# Patient Record
Sex: MALE | Race: WHITE | NOT HISPANIC OR LATINO
[De-identification: names, ages, dates, MRNs, and addresses within clinical notes are randomized per-mention and may not be internally consistent; named-entity substitution may affect disease eponyms.]

---

## 2017-06-27 ENCOUNTER — APPOINTMENT (OUTPATIENT)
Dept: FAMILY MEDICINE | Facility: CLINIC | Age: 47
End: 2017-06-27

## 2017-06-27 VITALS
DIASTOLIC BLOOD PRESSURE: 80 MMHG | HEIGHT: 68 IN | RESPIRATION RATE: 12 BRPM | WEIGHT: 176 LBS | HEART RATE: 78 BPM | OXYGEN SATURATION: 98 % | BODY MASS INDEX: 26.67 KG/M2 | SYSTOLIC BLOOD PRESSURE: 116 MMHG

## 2017-07-10 ENCOUNTER — APPOINTMENT (OUTPATIENT)
Dept: SURGERY | Facility: CLINIC | Age: 47
End: 2017-07-10

## 2017-07-17 ENCOUNTER — OUTPATIENT (OUTPATIENT)
Dept: OUTPATIENT SERVICES | Facility: HOSPITAL | Age: 47
LOS: 1 days | End: 2017-07-17
Payer: COMMERCIAL

## 2017-07-17 PROCEDURE — 85025 COMPLETE CBC W/AUTO DIFF WBC: CPT

## 2017-07-17 PROCEDURE — G0463: CPT

## 2017-07-17 PROCEDURE — 36415 COLL VENOUS BLD VENIPUNCTURE: CPT

## 2017-07-18 ENCOUNTER — APPOINTMENT (OUTPATIENT)
Dept: FAMILY MEDICINE | Facility: CLINIC | Age: 47
End: 2017-07-18

## 2017-07-18 VITALS
DIASTOLIC BLOOD PRESSURE: 70 MMHG | OXYGEN SATURATION: 99 % | BODY MASS INDEX: 26.51 KG/M2 | RESPIRATION RATE: 14 BRPM | SYSTOLIC BLOOD PRESSURE: 114 MMHG | WEIGHT: 174.9 LBS | HEIGHT: 68 IN | HEART RATE: 68 BPM

## 2017-07-18 RX ORDER — CIPROFLOXACIN HYDROCHLORIDE 500 MG/1
500 TABLET, FILM COATED ORAL
Qty: 20 | Refills: 0 | Status: COMPLETED | COMMUNITY
Start: 2017-06-20

## 2017-07-18 RX ORDER — METRONIDAZOLE 500 MG/1
500 TABLET ORAL
Qty: 30 | Refills: 0 | Status: COMPLETED | COMMUNITY
Start: 2017-06-20

## 2017-07-21 ENCOUNTER — RESULT REVIEW (OUTPATIENT)
Age: 47
End: 2017-07-21

## 2017-07-21 ENCOUNTER — OUTPATIENT (OUTPATIENT)
Dept: OUTPATIENT SERVICES | Facility: HOSPITAL | Age: 47
LOS: 1 days | Discharge: HOME | End: 2017-07-21
Payer: COMMERCIAL

## 2017-07-21 ENCOUNTER — TRANSCRIPTION ENCOUNTER (OUTPATIENT)
Age: 47
End: 2017-07-21

## 2017-07-21 PROCEDURE — 88302 TISSUE EXAM BY PATHOLOGIST: CPT | Mod: 26

## 2017-07-21 PROCEDURE — 49585: CPT

## 2017-07-22 PROCEDURE — 88302 TISSUE EXAM BY PATHOLOGIST: CPT

## 2017-07-22 PROCEDURE — 49587: CPT

## 2017-07-26 ENCOUNTER — APPOINTMENT (OUTPATIENT)
Dept: SURGERY | Facility: CLINIC | Age: 47
End: 2017-07-26

## 2017-07-26 DIAGNOSIS — K59.09 OTHER CONSTIPATION: ICD-10-CM

## 2017-07-26 DIAGNOSIS — K57.32 DIVERTICULITIS OF LARGE INTESTINE W/OUT PERFORATION OR ABSCESS W/OUT BLEEDING: ICD-10-CM

## 2017-07-27 RX ORDER — OXYCODONE AND ACETAMINOPHEN 5; 325 MG/1; MG/1
5-325 TABLET ORAL
Qty: 20 | Refills: 0 | Status: ACTIVE | COMMUNITY
Start: 2017-07-21

## 2017-07-31 ENCOUNTER — APPOINTMENT (OUTPATIENT)
Dept: SURGERY | Facility: CLINIC | Age: 47
End: 2017-07-31
Payer: COMMERCIAL

## 2017-07-31 DIAGNOSIS — Z80.42 FAMILY HISTORY OF MALIGNANT NEOPLASM OF PROSTATE: ICD-10-CM

## 2017-07-31 PROCEDURE — 99024 POSTOP FOLLOW-UP VISIT: CPT

## 2017-10-05 ENCOUNTER — EMERGENCY (EMERGENCY)
Facility: HOSPITAL | Age: 47
LOS: 1 days | Discharge: HOME | End: 2017-10-05
Admitting: FAMILY MEDICINE
Payer: COMMERCIAL

## 2017-10-05 PROCEDURE — 99285 EMERGENCY DEPT VISIT HI MDM: CPT

## 2017-10-05 PROCEDURE — 74177 CT ABD & PELVIS W/CONTRAST: CPT | Mod: 26

## 2017-10-06 PROCEDURE — 80053 COMPREHEN METABOLIC PANEL: CPT

## 2017-10-06 PROCEDURE — 99284 EMERGENCY DEPT VISIT MOD MDM: CPT | Mod: 25

## 2017-10-06 PROCEDURE — 74177 CT ABD & PELVIS W/CONTRAST: CPT

## 2017-10-06 PROCEDURE — 96365 THER/PROPH/DIAG IV INF INIT: CPT | Mod: XU

## 2017-10-06 PROCEDURE — 96375 TX/PRO/DX INJ NEW DRUG ADDON: CPT

## 2017-10-06 PROCEDURE — 85610 PROTHROMBIN TIME: CPT

## 2017-10-06 PROCEDURE — 85027 COMPLETE CBC AUTOMATED: CPT

## 2017-10-06 PROCEDURE — 85730 THROMBOPLASTIN TIME PARTIAL: CPT

## 2023-05-25 ENCOUNTER — NON-APPOINTMENT (OUTPATIENT)
Age: 53
End: 2023-05-25

## 2023-05-25 ENCOUNTER — APPOINTMENT (OUTPATIENT)
Dept: FAMILY MEDICINE | Facility: CLINIC | Age: 53
End: 2023-05-25
Payer: COMMERCIAL

## 2023-05-25 VITALS
DIASTOLIC BLOOD PRESSURE: 70 MMHG | WEIGHT: 142 LBS | SYSTOLIC BLOOD PRESSURE: 102 MMHG | TEMPERATURE: 99.1 F | HEIGHT: 68 IN | BODY MASS INDEX: 21.52 KG/M2 | OXYGEN SATURATION: 98 % | HEART RATE: 82 BPM

## 2023-05-25 DIAGNOSIS — Z87.19 PERSONAL HISTORY OF OTHER DISEASES OF THE DIGESTIVE SYSTEM: ICD-10-CM

## 2023-05-25 DIAGNOSIS — Z00.00 ENCOUNTER FOR GENERAL ADULT MEDICAL EXAMINATION W/OUT ABNORMAL FINDINGS: ICD-10-CM

## 2023-05-25 DIAGNOSIS — R68.82 DECREASED LIBIDO: ICD-10-CM

## 2023-05-25 DIAGNOSIS — Z12.5 ENCOUNTER FOR SCREENING FOR MALIGNANT NEOPLASM OF PROSTATE: ICD-10-CM

## 2023-05-25 DIAGNOSIS — K40.90 UNILATERAL INGUINAL HERNIA, W/OUT OBSTRUCTION OR GANGRENE, NOT SPECIFIED AS RECURRENT: ICD-10-CM

## 2023-05-25 DIAGNOSIS — E73.9 LACTOSE INTOLERANCE, UNSPECIFIED: ICD-10-CM

## 2023-05-25 DIAGNOSIS — K21.9 GASTRO-ESOPHAGEAL REFLUX DISEASE W/OUT ESOPHAGITIS: ICD-10-CM

## 2023-05-25 PROCEDURE — 93000 ELECTROCARDIOGRAM COMPLETE: CPT

## 2023-05-25 PROCEDURE — 99386 PREV VISIT NEW AGE 40-64: CPT | Mod: 25

## 2023-05-25 RX ORDER — PANTOPRAZOLE 20 MG/1
20 TABLET, DELAYED RELEASE ORAL DAILY
Refills: 1 | Status: ACTIVE | COMMUNITY
Start: 2023-05-25

## 2023-05-25 NOTE — HEALTH RISK ASSESSMENT
[Patient reported colonoscopy was abnormal] : Patient reported colonoscopy was abnormal [Current] : Current [20 or more] : 20 or more [ColonoscopyDate] : 02/22 [ColonoscopyComments] : polyps, repeat in 2025

## 2023-05-25 NOTE — ASSESSMENT
[FreeTextEntry1] : [facility] - pt declines surgery referral for repair at this time\par Castellanos's - cont protonix, f/u with GI\par decreased libido - check testosteorne level\par Healthy diet and regular exercise regimen discussed w/ pt.\par Screening labs ordered\par flu vaccine during flu season recommended\par Any questions call office

## 2023-05-25 NOTE — HISTORY OF PRESENT ILLNESS
[de-identified] : New patient to establish care.\par Pt in office for CPE. Pt has been feeling well. \par Pt has hx of Castellanos's esophagus. [facility]. Pt on pantoprazole 20mg q day. Last EGD 03/2022. Repeat in 2025.\par Pt has hx of diverticulitis 03/2022, s/p sigmoid resection due to stricture. Colorectal sx Dr. Rockwell (sp?)\par Pt c/o poor libido in past year.\par Pt has L inguinal hernia for 20 yrs, recently started causing pain in past few weeks.\par Pt has hx of OA of b/l knees, L hip, R elbow\par S/p b/l ulnar nerve transposition 2013\par Denies CP, palpitations, dyspnea, n/v.\par smokeless tobacco x 30 yrs. Failed patch in the past. 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

## 2023-06-01 LAB
ALBUMIN SERPL ELPH-MCNC: 5 G/DL
ALP BLD-CCNC: 72 U/L
ALT SERPL-CCNC: 20 U/L
ANION GAP SERPL CALC-SCNC: 14 MMOL/L
APPEARANCE: CLEAR
AST SERPL-CCNC: 27 U/L
BACTERIA: NEGATIVE /HPF
BILIRUB SERPL-MCNC: 0.5 MG/DL
BILIRUBIN URINE: NEGATIVE
BLOOD URINE: NEGATIVE
BUN SERPL-MCNC: 11 MG/DL
CALCIUM SERPL-MCNC: 9.7 MG/DL
CAST: 0 /LPF
CHLORIDE SERPL-SCNC: 100 MMOL/L
CHOLEST SERPL-MCNC: 215 MG/DL
CO2 SERPL-SCNC: 27 MMOL/L
COLOR: YELLOW
CREAT SERPL-MCNC: 0.82 MG/DL
EGFR: 106 ML/MIN/1.73M2
EPITHELIAL CELLS: 0 /HPF
ESTIMATED AVERAGE GLUCOSE: 105 MG/DL
GLUCOSE QUALITATIVE U: NEGATIVE MG/DL
GLUCOSE SERPL-MCNC: 81 MG/DL
HBA1C MFR BLD HPLC: 5.3 %
HDLC SERPL-MCNC: 77 MG/DL
KETONES URINE: NEGATIVE MG/DL
LDLC SERPL CALC-MCNC: 122 MG/DL
LEUKOCYTE ESTERASE URINE: NEGATIVE
MICROSCOPIC-UA: NORMAL
NITRITE URINE: NEGATIVE
NONHDLC SERPL-MCNC: 138 MG/DL
PH URINE: 6.5
POTASSIUM SERPL-SCNC: 4.3 MMOL/L
PROT SERPL-MCNC: 7.3 G/DL
PROTEIN URINE: NEGATIVE MG/DL
PSA SERPL-MCNC: 0.27 NG/ML
RED BLOOD CELLS URINE: 0 /HPF
SODIUM SERPL-SCNC: 142 MMOL/L
SPECIFIC GRAVITY URINE: 1
T4 FREE SERPL-MCNC: 1.5 NG/DL
TESTOST SERPL-MCNC: 438 NG/DL
TRIGL SERPL-MCNC: 77 MG/DL
TSH SERPL-ACNC: 2.42 UIU/ML
UROBILINOGEN URINE: 0.2 MG/DL
WHITE BLOOD CELLS URINE: 0 /HPF

## 2023-10-04 ENCOUNTER — APPOINTMENT (OUTPATIENT)
Dept: FAMILY MEDICINE | Facility: CLINIC | Age: 53
End: 2023-10-04
Payer: COMMERCIAL

## 2023-10-04 ENCOUNTER — LABORATORY RESULT (OUTPATIENT)
Age: 53
End: 2023-10-04

## 2023-10-04 VITALS
SYSTOLIC BLOOD PRESSURE: 138 MMHG | HEART RATE: 76 BPM | OXYGEN SATURATION: 99 % | BODY MASS INDEX: 21.52 KG/M2 | TEMPERATURE: 98 F | RESPIRATION RATE: 16 BRPM | WEIGHT: 142 LBS | DIASTOLIC BLOOD PRESSURE: 80 MMHG | HEIGHT: 68 IN

## 2023-10-04 DIAGNOSIS — H10.30 UNSPECIFIED ACUTE CONJUNCTIVITIS, UNSPECIFIED EYE: ICD-10-CM

## 2023-10-04 DIAGNOSIS — H01.119 ALLERGIC DERMATITIS OF UNSPECIFIED EYE, UNSPECIFIED EYELID: ICD-10-CM

## 2023-10-04 DIAGNOSIS — R53.83 OTHER FATIGUE: ICD-10-CM

## 2023-10-04 PROCEDURE — 99214 OFFICE O/P EST MOD 30 MIN: CPT

## 2023-10-04 RX ORDER — ERYTHROMYCIN 5 MG/G
5 OINTMENT OPHTHALMIC
Qty: 1 | Refills: 0 | Status: ACTIVE | COMMUNITY
Start: 2023-10-04 | End: 1900-01-01

## 2023-10-04 RX ORDER — HYDROCORTISONE 10 MG/G
1 OINTMENT TOPICAL TWICE DAILY
Qty: 1 | Refills: 0 | Status: ACTIVE | COMMUNITY
Start: 2023-10-04 | End: 1900-01-01

## 2023-10-06 ENCOUNTER — LABORATORY RESULT (OUTPATIENT)
Age: 53
End: 2023-10-06

## 2023-10-09 ENCOUNTER — NON-APPOINTMENT (OUTPATIENT)
Age: 53
End: 2023-10-09

## 2023-10-09 LAB
25(OH)D3 SERPL-MCNC: 23.7 NG/ML
ANA SER IF-ACNC: NEGATIVE
CRP SERPL-MCNC: <3 MG/L
DSDNA AB SER-ACNC: <12 IU/ML
ENA SS-A AB SER IA-ACNC: <0.2 AL
ENA SS-B AB SER IA-ACNC: <0.2 AL
ERYTHROCYTE [SEDIMENTATION RATE] IN BLOOD BY WESTERGREN METHOD: 9 MM/HR
FOLATE SERPL-MCNC: 12.5 NG/ML
RHEUMATOID FACT SER QL: <10 IU/ML
VIT B12 SERPL-MCNC: 459 PG/ML
VIT B6 SERPL-MCNC: 6.1 UG/L

## 2023-10-23 ENCOUNTER — APPOINTMENT (OUTPATIENT)
Dept: FAMILY MEDICINE | Facility: CLINIC | Age: 53
End: 2023-10-23
Payer: COMMERCIAL

## 2023-10-23 ENCOUNTER — TRANSCRIPTION ENCOUNTER (OUTPATIENT)
Age: 53
End: 2023-10-23

## 2023-10-23 VITALS
SYSTOLIC BLOOD PRESSURE: 130 MMHG | TEMPERATURE: 98.2 F | WEIGHT: 145 LBS | BODY MASS INDEX: 21.98 KG/M2 | HEART RATE: 85 BPM | HEIGHT: 68 IN | OXYGEN SATURATION: 98 % | DIASTOLIC BLOOD PRESSURE: 72 MMHG

## 2023-10-23 DIAGNOSIS — L30.9 DERMATITIS, UNSPECIFIED: ICD-10-CM

## 2023-10-23 PROCEDURE — 99214 OFFICE O/P EST MOD 30 MIN: CPT

## 2023-10-23 RX ORDER — KETOCONAZOLE 20 MG/G
2 CREAM TOPICAL TWICE DAILY
Qty: 1 | Refills: 0 | Status: ACTIVE | COMMUNITY
Start: 2023-10-23 | End: 1900-01-01

## 2023-10-24 RX ORDER — PREDNISONE 20 MG/1
20 TABLET ORAL
Qty: 15 | Refills: 0 | Status: ACTIVE | COMMUNITY
Start: 2023-10-23 | End: 1900-01-01

## 2023-10-27 LAB — BACTERIA SPEC CULT: ABNORMAL

## 2023-11-01 ENCOUNTER — APPOINTMENT (OUTPATIENT)
Dept: DERMATOLOGY | Facility: CLINIC | Age: 53
End: 2023-11-01
Payer: COMMERCIAL

## 2023-11-01 DIAGNOSIS — D22.5 MELANOCYTIC NEVI OF TRUNK: ICD-10-CM

## 2023-11-01 PROCEDURE — 99203 OFFICE O/P NEW LOW 30 MIN: CPT

## 2023-11-01 RX ORDER — MOMETASONE FUROATE 1 MG/G
0.1 CREAM TOPICAL
Qty: 1 | Refills: 1 | Status: ACTIVE | COMMUNITY
Start: 2023-11-01 | End: 1900-01-01

## 2023-11-01 RX ORDER — HYDROCORTISONE 25 MG/G
2.5 OINTMENT TOPICAL
Qty: 1 | Refills: 1 | Status: ACTIVE | COMMUNITY
Start: 2023-11-01 | End: 1900-01-01

## 2023-11-18 ENCOUNTER — APPOINTMENT (OUTPATIENT)
Dept: FAMILY MEDICINE | Facility: CLINIC | Age: 53
End: 2023-11-18
Payer: COMMERCIAL

## 2023-11-18 VITALS
HEIGHT: 68 IN | HEART RATE: 85 BPM | TEMPERATURE: 97.7 F | OXYGEN SATURATION: 98 % | DIASTOLIC BLOOD PRESSURE: 70 MMHG | WEIGHT: 147 LBS | BODY MASS INDEX: 22.28 KG/M2 | SYSTOLIC BLOOD PRESSURE: 120 MMHG

## 2023-11-18 DIAGNOSIS — K92.0 HEMATEMESIS: ICD-10-CM

## 2023-11-18 DIAGNOSIS — K22.70 BARRETT'S ESOPHAGUS W/OUT DYSPLASIA: ICD-10-CM

## 2023-11-18 PROCEDURE — 99213 OFFICE O/P EST LOW 20 MIN: CPT

## 2024-01-02 ENCOUNTER — APPOINTMENT (OUTPATIENT)
Dept: DERMATOLOGY | Facility: CLINIC | Age: 54
End: 2024-01-02
Payer: COMMERCIAL

## 2024-01-02 DIAGNOSIS — D48.5 NEOPLASM OF UNCERTAIN BEHAVIOR OF SKIN: ICD-10-CM

## 2024-01-02 PROCEDURE — 99213 OFFICE O/P EST LOW 20 MIN: CPT

## 2024-01-02 NOTE — PHYSICAL EXAM
[Alert] : alert [Oriented x 3] : ~L oriented x 3 [Well Nourished] : well nourished [FreeTextEntry3] : Face: Mild ill-defined erythema and scaling present in the mid forehead Eyelids: Symmetric erythema and scaling present-upper greater than lower  Right posterior shoulder: 5 x 3 mm brown/red slightly scaly papule, lighter and more raised in center

## 2024-01-02 NOTE — ASSESSMENT
[FreeTextEntry1] : Eczematous dermatitis of some type on the forehead and eyelids-?  Contact dermatitis ?  Dermatofibroma on right posterior shoulder

## 2024-01-02 NOTE — HISTORY OF PRESENT ILLNESS
[FreeTextEntry1] : Rash on face and growth on shoulder [de-identified] : Follow-up visit for 53-year-old white male last seen by me on November 1, 2023 (at which time he had a full skin exam) presents today for a reevaluation of a lesion in the right posterior shoulder describes a 3 x 3 mm erythematous excoriated papule-?  Traumatized dermatofibroma.  No history of skin cancer.  Patient also had a rash on the face diagnosed as an eczematous dermatitis of some type,?  Seborrheic dermatitis,?  Allergic contact dermatitis.  Treated with: Start Cetaphil gentle skin cleanser Start 2-1/2% hydrocortisone ointment around eyes 2-3 times a day as needed Start mometasone cream 0.1% to rash on face 2-3 times a day as needed  Rash has improved but keeps recurring-especially around the eyes. Patient feels the rash in the eyelids

## 2024-01-02 NOTE — PLAN
[TextEntry] : Will continue to observe the right posterior shoulder-photo taken Continue Cetaphil gentle skin cleanser Continue 2-1/2% hydrocortisone ointment to eyelids once or twice a day as needed Continue mometasone cream 0.1% to forehead once or twice a day as needed Patch testing discussed  Patient has appointment to see ophthalmologist  Return 2 months-recheck right shoulder

## 2024-03-04 ENCOUNTER — APPOINTMENT (OUTPATIENT)
Dept: DERMATOLOGY | Facility: CLINIC | Age: 54
End: 2024-03-04
Payer: COMMERCIAL

## 2024-03-04 DIAGNOSIS — L30.8 OTHER SPECIFIED DERMATITIS: ICD-10-CM

## 2024-03-04 PROCEDURE — 99213 OFFICE O/P EST LOW 20 MIN: CPT

## 2024-03-04 RX ORDER — MOMETASONE FUROATE 1 MG/ML
0.1 SOLUTION TOPICAL
Qty: 1 | Refills: 5 | Status: ACTIVE | COMMUNITY
Start: 2024-03-04 | End: 1900-01-01

## 2024-03-04 NOTE — PLAN
[TextEntry] : Continue Cetaphil gentle skin cleanser Start mometasone solution 0.1% to scalp and ears 2-3 times a day as needed Continue 2-1/2% hydrocortisone ointment to eyelids once or twice a day as needed Continue mometasone cream 0.1% to forehead once or twice a day as needed  Return 6 weeks  [employer] Castellanos student, served as chaperone and was present for the entire skin exam.

## 2024-03-04 NOTE — ASSESSMENT
[FreeTextEntry1] : Eczematous dermatitis of some type-now spreading onto the scalp and ears as well as the face Lesion on right posterior shoulder now more consistent with an eczematous dermatitis than a neoplasm

## 2024-03-04 NOTE — PHYSICAL EXAM
[Alert] : alert [Oriented x 3] : ~L oriented x 3 [Well Nourished] : well nourished [FreeTextEntry3] : Scalp: Focal pink scaly patches present-especially anterior frontal scalp Ears: Mild to moderate ill-defined erythema and scaling in the bowl and canals Eyelids: Moderate erythema and scaling present on the upper and lower eyelids Face otherwise mostly clear  Right posterior shoulder: 13 x 7 mm ill-defined erythematous and hyperpigmented slightly scaly patch-1 small telangiectasias seen on dermoscopy

## 2024-03-28 ENCOUNTER — APPOINTMENT (OUTPATIENT)
Dept: FAMILY MEDICINE | Facility: CLINIC | Age: 54
End: 2024-03-28
Payer: COMMERCIAL

## 2024-03-28 VITALS
TEMPERATURE: 97.3 F | BODY MASS INDEX: 23.49 KG/M2 | DIASTOLIC BLOOD PRESSURE: 80 MMHG | WEIGHT: 155 LBS | SYSTOLIC BLOOD PRESSURE: 128 MMHG | HEIGHT: 68 IN | OXYGEN SATURATION: 98 % | HEART RATE: 74 BPM

## 2024-03-28 DIAGNOSIS — H92.01 OTALGIA, RIGHT EAR: ICD-10-CM

## 2024-03-28 DIAGNOSIS — M79.641 PAIN IN RIGHT HAND: ICD-10-CM

## 2024-03-28 DIAGNOSIS — R61 GENERALIZED HYPERHIDROSIS: ICD-10-CM

## 2024-03-28 PROCEDURE — 99214 OFFICE O/P EST MOD 30 MIN: CPT

## 2024-03-28 RX ORDER — AMOXICILLIN AND CLAVULANATE POTASSIUM 875; 125 MG/1; MG/1
875-125 TABLET, COATED ORAL
Qty: 20 | Refills: 0 | Status: ACTIVE | COMMUNITY
Start: 2024-03-28 | End: 1900-01-01

## 2024-03-28 NOTE — HISTORY OF PRESENT ILLNESS
[FreeTextEntry8] : Pt c/o night sweats x 1 year. Pt wakes up drenched in sweat at 4AM every night. Pt had similar symptoms when he had diverticulitis in the past. Denies fever, abd pain, cough, wt loss. Of note pt had tick bite 1 year ago Pt c/o "lump" between R 3rd and 4th fingers causing pain for several years. Pt has pain w/ gripping and lifting, pain radiates into 3rd finger. Lump gets stiff and swollen w/ activity. Used to be mobile, now stuck on. Pt also c/o swelling and pressure that extends from neck to his R ear x 2 weeks.  Pt moving to [place] in 2 mos

## 2024-03-28 NOTE — PHYSICAL EXAM
[Soft] : abdomen soft [Non Tender] : non-tender [Non-distended] : non-distended [de-identified] : GEOFFREY gamino [Normal] : affect was normal and insight and judgment were intact [de-identified] : R 3rd MCP joint tenderness, swelling

## 2024-03-28 NOTE — ASSESSMENT
[FreeTextEntry1] : night sweats - Check labs. tick panel. cxr R ear pain - augmentin course R hand pain - xray of hand

## 2024-03-30 ENCOUNTER — APPOINTMENT (OUTPATIENT)
Dept: RADIOLOGY | Facility: CLINIC | Age: 54
End: 2024-03-30
Payer: COMMERCIAL

## 2024-03-30 ENCOUNTER — OUTPATIENT (OUTPATIENT)
Dept: OUTPATIENT SERVICES | Facility: HOSPITAL | Age: 54
LOS: 1 days | End: 2024-03-30
Payer: COMMERCIAL

## 2024-03-30 DIAGNOSIS — M79.641 PAIN IN RIGHT HAND: ICD-10-CM

## 2024-03-30 PROCEDURE — 73130 X-RAY EXAM OF HAND: CPT

## 2024-03-30 PROCEDURE — 73130 X-RAY EXAM OF HAND: CPT | Mod: 26,RT

## 2024-03-30 PROCEDURE — 71046 X-RAY EXAM CHEST 2 VIEWS: CPT | Mod: 26

## 2024-03-30 PROCEDURE — 71046 X-RAY EXAM CHEST 2 VIEWS: CPT

## 2024-04-04 DIAGNOSIS — R93.89 ABNORMAL FINDINGS ON DIAGNOSTIC IMAGING OF OTHER SPECIFIED BODY STRUCTURES: ICD-10-CM

## 2024-04-04 LAB
A PHAGOCYTOPH IGG TITR SER IF: NORMAL
ALBUMIN SERPL ELPH-MCNC: 5 G/DL
ALP BLD-CCNC: 76 U/L
ALT SERPL-CCNC: 14 U/L
ANION GAP SERPL CALC-SCNC: 12 MMOL/L
APPEARANCE: CLEAR
AST SERPL-CCNC: 24 U/L
B BURGDOR AB SER QL IA: 0.18 IV
B MICROTI IGG TITR SER: NORMAL
BACTERIA UR CULT: NORMAL
BACTERIA: NEGATIVE /HPF
BASOPHILS # BLD AUTO: 0.03 K/UL
BASOPHILS NFR BLD AUTO: 0.4 %
BILIRUB SERPL-MCNC: 0.5 MG/DL
BILIRUBIN URINE: NEGATIVE
BLOOD URINE: NEGATIVE
BUN SERPL-MCNC: 9 MG/DL
CALCIUM SERPL-MCNC: 9.6 MG/DL
CAST: 0 /LPF
CHLORIDE SERPL-SCNC: 104 MMOL/L
CO2 SERPL-SCNC: 26 MMOL/L
COLOR: YELLOW
CREAT SERPL-MCNC: 0.76 MG/DL
CRP SERPL-MCNC: 3 MG/L
E CHAFFEENSIS IGG TITR SER IF: NORMAL
EGFR: 108 ML/MIN/1.73M2
EOSINOPHIL # BLD AUTO: 0.06 K/UL
EOSINOPHIL NFR BLD AUTO: 0.8 %
EPITHELIAL CELLS: 0 /HPF
ERYTHROCYTE [SEDIMENTATION RATE] IN BLOOD BY WESTERGREN METHOD: 21 MM/HR
GLUCOSE QUALITATIVE U: NEGATIVE MG/DL
GLUCOSE SERPL-MCNC: 99 MG/DL
HCT VFR BLD CALC: 42.7 %
HGB BLD-MCNC: 14.5 G/DL
HIV1+2 AB SPEC QL IA.RAPID: NONREACTIVE
IMM GRANULOCYTES NFR BLD AUTO: 0.3 %
KETONES URINE: NEGATIVE MG/DL
LEUKOCYTE ESTERASE URINE: NEGATIVE
LYMPHOCYTES # BLD AUTO: 1.81 K/UL
LYMPHOCYTES NFR BLD AUTO: 23.3 %
M TB IFN-G BLD-IMP: NEGATIVE
MAN DIFF?: NORMAL
MCHC RBC-ENTMCNC: 30.1 PG
MCHC RBC-ENTMCNC: 34 GM/DL
MCV RBC AUTO: 88.6 FL
MICROSCOPIC-UA: NORMAL
MONOCYTES # BLD AUTO: 0.67 K/UL
MONOCYTES NFR BLD AUTO: 8.6 %
NEUTROPHILS # BLD AUTO: 5.18 K/UL
NEUTROPHILS NFR BLD AUTO: 66.6 %
NITRITE URINE: NEGATIVE
PH URINE: 6.5
PLATELET # BLD AUTO: 323 K/UL
POTASSIUM SERPL-SCNC: 4.7 MMOL/L
PROT SERPL-MCNC: 7.5 G/DL
PROTEIN URINE: NEGATIVE MG/DL
PSA SERPL-MCNC: 0.33 NG/ML
QUANTIFERON TB PLUS MITOGEN MINUS NIL: 9.12 IU/ML
QUANTIFERON TB PLUS NIL: 0.05 IU/ML
QUANTIFERON TB PLUS TB1 MINUS NIL: 0 IU/ML
QUANTIFERON TB PLUS TB2 MINUS NIL: 0 IU/ML
RBC # BLD: 4.82 M/UL
RBC # FLD: 12.8 %
RED BLOOD CELLS URINE: 0 /HPF
SODIUM SERPL-SCNC: 142 MMOL/L
SPECIFIC GRAVITY URINE: 1.01
T4 FREE SERPL-MCNC: 1.4 NG/DL
TSH SERPL-ACNC: 1.54 UIU/ML
UROBILINOGEN URINE: 0.2 MG/DL
WBC # FLD AUTO: 7.77 K/UL
WHITE BLOOD CELLS URINE: 0 /HPF

## 2024-04-11 ENCOUNTER — NON-APPOINTMENT (OUTPATIENT)
Age: 54
End: 2024-04-11

## 2024-04-22 DIAGNOSIS — R93.89 ABNORMAL FINDINGS ON DIAGNOSTIC IMAGING OF OTHER SPECIFIED BODY STRUCTURES: ICD-10-CM

## 2024-04-22 RX ORDER — DOXYCYCLINE HYCLATE 100 MG/1
100 CAPSULE ORAL
Qty: 20 | Refills: 0 | Status: ACTIVE | COMMUNITY
Start: 2024-04-22 | End: 1900-01-01

## 2024-04-22 RX ORDER — AZITHROMYCIN 250 MG/1
250 TABLET, FILM COATED ORAL
Qty: 1 | Refills: 0 | Status: ACTIVE | COMMUNITY
Start: 2024-04-22 | End: 1900-01-01

## 2024-04-29 ENCOUNTER — APPOINTMENT (OUTPATIENT)
Dept: DERMATOLOGY | Facility: CLINIC | Age: 54
End: 2024-04-29